# Patient Record
Sex: MALE | Employment: FULL TIME | URBAN - METROPOLITAN AREA
[De-identification: names, ages, dates, MRNs, and addresses within clinical notes are randomized per-mention and may not be internally consistent; named-entity substitution may affect disease eponyms.]

---

## 2023-06-14 ENCOUNTER — OFFICE VISIT (OUTPATIENT)
Dept: OBGYN CLINIC | Facility: OTHER | Age: 43
End: 2023-06-14
Payer: COMMERCIAL

## 2023-06-14 VITALS
BODY MASS INDEX: 34.13 KG/M2 | WEIGHT: 252 LBS | SYSTOLIC BLOOD PRESSURE: 144 MMHG | DIASTOLIC BLOOD PRESSURE: 90 MMHG | HEIGHT: 72 IN | HEART RATE: 73 BPM

## 2023-06-14 DIAGNOSIS — S83.242A OTHER TEAR OF MEDIAL MENISCUS, CURRENT INJURY, LEFT KNEE, INITIAL ENCOUNTER: Primary | ICD-10-CM

## 2023-06-14 PROCEDURE — 99204 OFFICE O/P NEW MOD 45 MIN: CPT | Performed by: ORTHOPAEDIC SURGERY

## 2023-06-14 RX ORDER — CHLORHEXIDINE GLUCONATE 0.12 MG/ML
15 RINSE ORAL ONCE
Status: CANCELLED | OUTPATIENT
Start: 2023-06-20 | End: 2023-06-14

## 2023-06-14 RX ORDER — CEFAZOLIN SODIUM 2 G/50ML
2000 SOLUTION INTRAVENOUS ONCE
Status: CANCELLED | OUTPATIENT
Start: 2023-06-20 | End: 2023-06-14

## 2023-06-14 RX ORDER — IBUPROFEN 600 MG/1
600 TABLET ORAL EVERY 6 HOURS PRN
COMMUNITY
Start: 2023-05-22 | End: 2024-05-21

## 2023-06-14 NOTE — PROGRESS NOTES
Orthopaedic Surgery - Office Note  Roxie Hernandez (55 y o  male)   : 1980   MRN: [id number]  Encounter Date: 2023    Chief Complaint   Patient presents with   • Left Knee - Pain       Assessment / Plan  Left knee displaced bucket-handle medial meniscus tear and chronic partial ACL tear    · The diagnosis and treatment options were reviewed  · The patient wishes to proceed with left knee arthroscopy with medial mensicus repair vs menisectomy  Scheduled for 2023   · The risks, benefits, and alternatives were discussed  · Written consent was obtained  · Patient would like to move forward with mensicus surgery but will defer ACL reconstruction  If he experiences instability after recovering from the meniscus we can re-consider ACL reconstruction     · Reviewed recent MRI during today's visit  · Ice, heat and anti-inflammatories prn   · Soft knee brace prn   Return for 3-4 days post op with Ankit Dhillon   History of Present Illness  Roxie Hernandez is a 38 y o  male who presents for evaluation left knee ACL tear  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 in his knee  He immediately had pain with WB  He followed up with [facility] who recommended MRI and upon review they recommended ACL reconstruction and mensicus  He is here today for a second opinion  He states that he is having minimal pain but does notes some decreased extension  He states that in high school he had a left knee partial ACL tear which was treated non-operatively  He has been wearing a soft knee brace which offers him mild relief in symptoms  He has had mild swelling since the injury  He is active and would like to continue to be active  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  He is currently out of work  Review of Systems  Pertinent items are noted in HPI  All other systems were reviewed and are negative      Physical Exam  /90 (BP Location: Left arm, Patient Position: Sitting, Cuff Size: Adult)   Pulse 73   Ht 6' (1 829 m) Comment: verbal  Wt 114 kg (252 lb)   BMI 34 18 kg/m²   Cons: Appears well  No apparent distress  Psych: Alert  Oriented x3  Mood and affect normal   Eyes: PERRLA, EOMI  Resp: Normal effort  No audible wheezing or stridor  CV: Palpable pulse  No discernable arrhythmia  No LE edema  Lymph:  No palpable cervical, axillary, or inguinal lymphadenopathy  Skin: Warm  No palpable masses  No visible lesions  Neuro: Normal muscle tone  Normal and symmetric DTR's  Left Knee Exam  Alignment:  Normal knee alignment  Inspection:  mild knee swelling  No ecchymosis  Palpation:  mild medial joint line tenderness  No effusion  ROM:  Knee Extension 10  Knee Flexion 115/120  Strength:  Able to SLR without lag  Stability:  Guarding with Lachman (-) Varus instability  (-) Valgus instability  (-) Posterior drawer  Tests:  (+) Jarad  Patella:  Normal patellar mobility  Neurovascular:  Sensation intact in DP/SP/Hayes/Sa/T nerve distributions  2+ DP & PT pulses  Gait:  Antalgic  Studies Reviewed  I have personally reviewed pertinent films in PACS  MRI of left knee - outside images from 06/03/2023 showing partial ACL tear and flipped fragment of medial meniscus     Procedures  No procedures today  Medical, Surgical, Family, and Social History  The patient's medical history, family history, and social history, were reviewed and updated as appropriate  History reviewed  No pertinent past medical history  History reviewed  No pertinent surgical history  History reviewed  No pertinent family history  Social History     Occupational History   • Not on file   Tobacco Use   • Smoking status: Never     Passive exposure: Never   • Smokeless tobacco: Never   Substance and Sexual Activity   • Alcohol use:  Yes   • Drug use: Not on file   • Sexual activity: Not on file       No Known Allergies      Current Outpatient Medications:   •  ibuprofen (MOTRIN) 600 mg tablet, Take 600 mg by mouth every 6 (six) hours as needed, Disp: , Rfl:       [employer]    I,:  Valentino Labrum am acting as a scribe while in the presence of the attending physician :       I,:  Tony Carver MD personally performed the services described in this documentation    as scribed in my presence :

## 2023-06-15 NOTE — PRE-PROCEDURE INSTRUCTIONS
Pre-Surgery Instructions:   Medication Instructions   • ibuprofen (MOTRIN) 600 mg tablet Stop taking 3 days prior to surgery  Medication instructions for day surgery reviewed  Please use only a sip of water to take your instructed medications  Avoid all over the counter vitamins, supplements and NSAIDS for one week prior to surgery per anesthesia guidelines  Tylenol is ok to take as needed  You will receive a call one business day prior to surgery with an arrival time and hospital directions  If your surgery is scheduled on a Monday, the hospital will be calling you on the Friday prior to your surgery  If you have not heard from anyone by 8pm, please call the hospital supervisor through the hospital  at [phone]  Jacqueline Fernandezlewis [phone])  Do not eat or drink anything after midnight the night before your surgery, including candy, mints, lifesavers, or chewing gum  Do not drink alcohol 24hrs before your surgery  Try not to smoke at least 24hrs before your surgery  Follow the pre surgery showering instructions as listed in the [facility] Surgical Experience Booklet” or otherwise provided by your surgeon's office  Do not shave the surgical area 24 hours before surgery  Do not apply any lotions, creams, including makeup, cologne, deodorant, or perfumes after showering on the day of your surgery  No contact lenses, eye make-up, or artificial eyelashes  Remove nail polish, including gel polish, and any artificial, gel, or acrylic nails if possible  Remove all jewelry including rings and body piercing jewelry  Wear causal clothing that is easy to take on and off  Consider your type of surgery  Keep any valuables, jewelry, piercings at home  Please bring any specially ordered equipment (sling, braces) if indicated  Arrange for a responsible person to drive you to and from the hospital on the day of your surgery  Visitor Guidelines discussed       Call the surgeon's office with any new illnesses, exposures, or additional questions prior to surgery  Please reference your [facility] Surgical Experience Booklet” for additional information to prepare for your upcoming surgery

## 2023-06-18 ENCOUNTER — ANESTHESIA EVENT (OUTPATIENT)
Dept: PERIOP | Facility: HOSPITAL | Age: 43
End: 2023-06-18
Payer: COMMERCIAL

## 2023-06-20 ENCOUNTER — ANESTHESIA (OUTPATIENT)
Dept: PERIOP | Facility: HOSPITAL | Age: 43
End: 2023-06-20
Payer: COMMERCIAL

## 2023-06-20 ENCOUNTER — HOSPITAL ENCOUNTER (OUTPATIENT)
Facility: HOSPITAL | Age: 43
Setting detail: OUTPATIENT SURGERY
Discharge: HOME | End: 2023-06-20
Attending: ORTHOPAEDIC SURGERY | Admitting: ORTHOPAEDIC SURGERY
Payer: COMMERCIAL

## 2023-06-20 VITALS
RESPIRATION RATE: 16 BRPM | WEIGHT: 242.06 LBS | TEMPERATURE: 98.2 F | BODY MASS INDEX: 32.79 KG/M2 | OXYGEN SATURATION: 97 % | DIASTOLIC BLOOD PRESSURE: 77 MMHG | HEIGHT: 72 IN | HEART RATE: 78 BPM | SYSTOLIC BLOOD PRESSURE: 164 MMHG

## 2023-06-20 DIAGNOSIS — S83.242A OTHER TEAR OF MEDIAL MENISCUS OF LEFT KNEE AS CURRENT INJURY, INITIAL ENCOUNTER: Primary | ICD-10-CM

## 2023-06-20 PROCEDURE — 29882 ARTHRS KNE SRG MNISC RPR M/L: CPT | Performed by: ORTHOPAEDIC SURGERY

## 2023-06-20 PROCEDURE — C1713 ANCHOR/SCREW BN/BN,TIS/BN: HCPCS | Performed by: ORTHOPAEDIC SURGERY

## 2023-06-20 DEVICE — FAST-FIX 360 CURVED MENISCAL                                    REPAIR SYSTEM
Type: IMPLANTABLE DEVICE | Site: KNEE | Status: FUNCTIONAL
Brand: FAST-FIX

## 2023-06-20 RX ORDER — ONDANSETRON 4 MG/1
4 TABLET, ORALLY DISINTEGRATING ORAL EVERY 8 HOURS PRN
Qty: 15 TABLET | Refills: 0 | Status: SHIPPED | OUTPATIENT
Start: 2023-06-20

## 2023-06-20 RX ORDER — FENTANYL CITRATE/PF 50 MCG/ML
25 SYRINGE (ML) INJECTION
Status: DISCONTINUED | OUTPATIENT
Start: 2023-06-20 | End: 2023-06-20 | Stop reason: HOSPADM

## 2023-06-20 RX ORDER — CEFAZOLIN SODIUM 2 G/50ML
2000 SOLUTION INTRAVENOUS ONCE
Status: COMPLETED | OUTPATIENT
Start: 2023-06-20 | End: 2023-06-20

## 2023-06-20 RX ORDER — KETOROLAC TROMETHAMINE 30 MG/ML
INJECTION, SOLUTION INTRAMUSCULAR; INTRAVENOUS AS NEEDED
Status: DISCONTINUED | OUTPATIENT
Start: 2023-06-20 | End: 2023-06-20

## 2023-06-20 RX ORDER — PROPOFOL 10 MG/ML
INJECTION, EMULSION INTRAVENOUS AS NEEDED
Status: DISCONTINUED | OUTPATIENT
Start: 2023-06-20 | End: 2023-06-20

## 2023-06-20 RX ORDER — OXYCODONE HYDROCHLORIDE 5 MG/1
5 TABLET ORAL EVERY 4 HOURS PRN
Qty: 40 TABLET | Refills: 0 | Status: SHIPPED | OUTPATIENT
Start: 2023-06-20 | End: 2023-06-30

## 2023-06-20 RX ORDER — SODIUM CHLORIDE 9 MG/ML
125 INJECTION, SOLUTION INTRAVENOUS CONTINUOUS
Status: DISCONTINUED | OUTPATIENT
Start: 2023-06-20 | End: 2023-06-20 | Stop reason: HOSPADM

## 2023-06-20 RX ORDER — LIDOCAINE HCL/PF 100 MG/5ML
SYRINGE (ML) INJECTION AS NEEDED
Status: DISCONTINUED | OUTPATIENT
Start: 2023-06-20 | End: 2023-06-20

## 2023-06-20 RX ORDER — CHLORHEXIDINE GLUCONATE 0.12 MG/ML
15 RINSE ORAL ONCE
Status: COMPLETED | OUTPATIENT
Start: 2023-06-20 | End: 2023-06-20

## 2023-06-20 RX ORDER — DEXAMETHASONE SODIUM PHOSPHATE 4 MG/ML
INJECTION, SOLUTION INTRA-ARTICULAR; INTRALESIONAL; INTRAMUSCULAR; INTRAVENOUS; SOFT TISSUE AS NEEDED
Status: DISCONTINUED | OUTPATIENT
Start: 2023-06-20 | End: 2023-06-20

## 2023-06-20 RX ORDER — ONDANSETRON 2 MG/ML
4 INJECTION INTRAMUSCULAR; INTRAVENOUS ONCE AS NEEDED
Status: DISCONTINUED | OUTPATIENT
Start: 2023-06-20 | End: 2023-06-20 | Stop reason: HOSPADM

## 2023-06-20 RX ORDER — ONDANSETRON 2 MG/ML
INJECTION INTRAMUSCULAR; INTRAVENOUS AS NEEDED
Status: DISCONTINUED | OUTPATIENT
Start: 2023-06-20 | End: 2023-06-20

## 2023-06-20 RX ORDER — ROPIVACAINE HYDROCHLORIDE 5 MG/ML
INJECTION, SOLUTION EPIDURAL; INFILTRATION; PERINEURAL
Status: COMPLETED | OUTPATIENT
Start: 2023-06-20 | End: 2023-06-20

## 2023-06-20 RX ORDER — OXYCODONE HYDROCHLORIDE 5 MG/1
5 TABLET ORAL EVERY 4 HOURS PRN
Status: DISCONTINUED | OUTPATIENT
Start: 2023-06-20 | End: 2023-06-20 | Stop reason: HOSPADM

## 2023-06-20 RX ORDER — FENTANYL CITRATE 50 UG/ML
INJECTION, SOLUTION INTRAMUSCULAR; INTRAVENOUS AS NEEDED
Status: DISCONTINUED | OUTPATIENT
Start: 2023-06-20 | End: 2023-06-20

## 2023-06-20 RX ORDER — OXYCODONE HYDROCHLORIDE 5 MG/1
10 TABLET ORAL EVERY 4 HOURS PRN
Status: DISCONTINUED | OUTPATIENT
Start: 2023-06-20 | End: 2023-06-20 | Stop reason: HOSPADM

## 2023-06-20 RX ORDER — ASPIRIN 81 MG/1
81 TABLET ORAL 2 TIMES DAILY
Qty: 28 TABLET | Refills: 0 | Status: SHIPPED | OUTPATIENT
Start: 2023-06-20 | End: 2023-07-04

## 2023-06-20 RX ORDER — MAGNESIUM HYDROXIDE 1200 MG/15ML
LIQUID ORAL AS NEEDED
Status: DISCONTINUED | OUTPATIENT
Start: 2023-06-20 | End: 2023-06-20 | Stop reason: HOSPADM

## 2023-06-20 RX ORDER — LIDOCAINE HYDROCHLORIDE 20 MG/ML
INJECTION, SOLUTION EPIDURAL; INFILTRATION; INTRACAUDAL; PERINEURAL
Status: COMPLETED | OUTPATIENT
Start: 2023-06-20 | End: 2023-06-20

## 2023-06-20 RX ADMIN — PROPOFOL 200 MG: 10 INJECTION, EMULSION INTRAVENOUS at 12:26

## 2023-06-20 RX ADMIN — LIDOCAINE HYDROCHLORIDE 80 MG: 20 INJECTION INTRAVENOUS at 12:26

## 2023-06-20 RX ADMIN — FENTANYL CITRATE 50 MCG: 50 INJECTION INTRAMUSCULAR; INTRAVENOUS at 13:15

## 2023-06-20 RX ADMIN — ROPIVACAINE HYDROCHLORIDE 30 ML: 5 INJECTION EPIDURAL; INFILTRATION; PERINEURAL at 12:02

## 2023-06-20 RX ADMIN — ONDANSETRON 4 MG: 2 INJECTION INTRAMUSCULAR; INTRAVENOUS at 12:26

## 2023-06-20 RX ADMIN — OXYCODONE HYDROCHLORIDE 5 MG: 5 TABLET ORAL at 14:52

## 2023-06-20 RX ADMIN — CEFAZOLIN SODIUM 2000 MG: 2 SOLUTION INTRAVENOUS at 12:21

## 2023-06-20 RX ADMIN — LIDOCAINE HYDROCHLORIDE 20 ML: 20 INJECTION, SOLUTION EPIDURAL; INFILTRATION; INTRACAUDAL at 12:02

## 2023-06-20 RX ADMIN — FENTANYL CITRATE 25 MCG: 50 INJECTION INTRAMUSCULAR; INTRAVENOUS at 13:57

## 2023-06-20 RX ADMIN — SODIUM CHLORIDE 125 ML/HR: 0.9 INJECTION, SOLUTION INTRAVENOUS at 10:05

## 2023-06-20 RX ADMIN — CHLORHEXIDINE GLUCONATE 15 ML: 1.2 RINSE ORAL at 09:51

## 2023-06-20 RX ADMIN — KETOROLAC TROMETHAMINE 30 MG: 30 INJECTION, SOLUTION INTRAMUSCULAR at 13:23

## 2023-06-20 RX ADMIN — DEXAMETHASONE SODIUM PHOSPHATE 10 MG: 4 INJECTION INTRA-ARTICULAR; INTRALESIONAL; INTRAMUSCULAR; INTRAVENOUS; SOFT TISSUE at 12:26

## 2023-06-20 RX ADMIN — FENTANYL CITRATE 50 MCG: 50 INJECTION INTRAMUSCULAR; INTRAVENOUS at 13:34

## 2023-06-20 RX ADMIN — FENTANYL CITRATE 25 MCG: 50 INJECTION INTRAMUSCULAR; INTRAVENOUS at 14:14

## 2023-06-20 RX ADMIN — SODIUM CHLORIDE: 0.9 INJECTION, SOLUTION INTRAVENOUS at 13:13

## 2023-06-20 NOTE — ANESTHESIA PREPROCEDURE EVALUATION
Procedure:  ARTHROSCOPY KNEE, partial meniscectomy vs repair (Left: Knee)    Relevant Problems   Musculoskeletal and Integument   (+) Medial meniscus tear        Physical Exam    Airway    Mallampati score: I  TM Distance: >3 FB  Neck ROM: full     Dental       Cardiovascular  Rhythm: regular, Rate: normal, Cardiovascular exam normal    Pulmonary  Pulmonary exam normal     Other Findings        Anesthesia Plan  ASA Score- 2     Anesthesia Type- general with ASA Monitors  Additional Monitors:   Airway Plan: LMA  Comment: Consent fo nerve block obtained   Plan Factors-Exercise tolerance (METS): >4 METS  Chart reviewed  Existing labs reviewed  Patient summary reviewed  Patient is not a current smoker  Obstructive sleep apnea risk education given perioperatively  Induction- intravenous  Postoperative Plan-     Informed Consent- Anesthetic plan and risks discussed with patient

## 2023-06-20 NOTE — DISCHARGE INSTR - AVS FIRST PAGE
POSTOPERATIVE INSTRUCTIONS following KNEE SURGERY    MEDICATIONS:  Resume all home medications unless otherwise instructed by your surgeon  Pain Medication:  Oxycodone 5 mg, 1-3 tablets every 3 hours as needed  If you were given a regional anesthetic (nerve block), please begin taking the pain medication as soon as you get home, even if you have minimal or no pain  DO NOT WAIT FOR THE NERVE BLOCK TO WEAR OFF  Possible side effects include nausea, constipation, and urinary retention  If you experience these side effects, please call our office for assistance  Pain med refills are authorized only during office hours (8am-4pm, Mon-Fri)  Anti-Inflammatory:  Resume your home anti-inflammatory medication  Take with food  Stop if you experience nausea, reflux, or stomach pain  Nausea Medication:  Zofran ODT 4 mg, 1 tablet every 6 hours as needed  Fill prescription ONLY if you expericnce severe nausea  Blood Clot Prevention:  Aspirin 81 mg, 1 tablet twice daily for 2 weeks  Pump your foot up and down 20 times per hour while you are less mobile  WOUND CARE:  Keep the dressing clean and dry  Light drainage may occur the first 2 days postop  You may remove the dressings and get the incision wet in the shower 72 hours after surgery  DO NOT remove steri-strips or sutures  DO NOT immerse the incision under water  Carefully pat the incision dry  If there is wound drainage, re-apply a fresh dry gauze dressing  Please call our office ([phone]) if you experience either of the following:  Sudden increase in swelling, redness, or warmth at the surgical site  Excessive incisional drainage that persists beyond the 3rd day after surgery  Oral temperature greater than 101 degrees, not relieved with Tylenol  Shortness of breath, chest pain, nausea, or any other concerning symptoms    SWELLING CONTROL:  Cold Therapy:   The cold therapy device may be used either continuously or only as needed, according to your "preference  Do not let the pad directly touch your skin  Alternatively, apply ice (20 min on, 20 min off) as often as you feel is necessary  Elevation:  Elevate the entire leg above heart level  Place pillows under your ankle to keep your knee straight  Compression:  Apply ACE wraps or a thigh-length compression stocking as needed  RANGE OF MOTION:  You are allowed LIMITED RANGE OF MOTION from 0 degrees (full extension) to 90 degrees of flexion    IMMOBILIZATION:  Your elbow brace should be WORN AT ALL TIMES, including sleep  The brace may be unlocked from 0 to 90 degrees  You may remove the brace only for showering  ACTIVITY:   BEAR FULL WEIGHT AS TOLERATED on the operative leg  Use crutches to assist only as needed  Using Crutches on Stairs:  Going up, lead with your \"good\" (nonoperative) leg  Going down, lead with your \"bad\" (operative) leg  Use a hand rail when available  Knee Extension:  Place a rolled towel or pillow under your ankle for 20-30 minutes 3-5 times per day  This will help to maintain full knee extension  Quad Sets:  Sit or lie with your knee straight  Tighten your quadriceps (front thigh) muscle  Hold for 3 seconds, then relax  Repeat 20 times per hour while awake  PHYSICAL THERAPY:  Begin therapy 5 TO 7 DAYS AFTER SURGERY  You were given a prescription for therapy at your preoperative office visit  If you do not have physical therapy scheduled yet, please call our office for assistance  FOLLOW-UP APPOINTMENT:  4-5 days after surgery with:    Dr Cecilia Jose, [address] [facility]  [address] [place] [address], [address] [place] [address], [address] [facility]  [phone] ([facility] [address])  [phone] (After-Hours)    "

## 2023-06-20 NOTE — ANESTHESIA PROCEDURE NOTES
Peripheral Block    Start time: 6/20/2023 11:56 AM  Reason for block: at surgeon's request and post-op pain management  Staffing  Performed by: Fabby Nelson DO  Authorized by: Fabby Nelson DO    Preanesthetic Checklist  Completed: patient identified, IV checked, site marked, risks and benefits discussed, surgical consent, monitors and equipment checked, pre-op evaluation and timeout performed  Peripheral Block  Patient position: sitting  Prep: Betadine  Patient monitoring: heart rate and frequent blood pressure checks  Block type:  Intra-articular  Laterality: left  Injection technique: single-shot  Ultrasound permanent image savedropivacaine (NAROPIN) 0 5 % - Perineural   30 mL - 6/20/2023 12:02:00 PM  lidocaine (XYLOCAINE) 2 % - Infiltration   20 mL - 6/20/2023 12:02:00 PM  Needle  Needle type: long-bevel   Needle gauge: 18 G  Needle length: 10 cm  Needle localization: ultrasound guidance  Test dose: negative  Assessment  Injection assessment: incremental injection  Paresthesia pain: none  Heart rate change: no  Slow fractionated injection: yes  Post-procedure:  site cleaned  patient tolerated the procedure well with no immediate complications

## 2023-06-20 NOTE — ANESTHESIA POSTPROCEDURE EVALUATION
Post-Op Assessment Note    CV Status:  Stable    Pain management: adequate     Mental Status:  Awake   Hydration Status:  Stable   PONV Controlled:  Controlled   Airway Patency:  Patent      Post Op Vitals Reviewed: Yes      Staff: Anesthesiologist         No notable events documented      /93 (06/20/23 1342)    Temp 98 6 °F (37 °C) (06/20/23 1342)    Pulse 78 (06/20/23 1342)   Resp 12 (06/20/23 1342)    SpO2 96 % (06/20/23 1342)

## 2023-06-20 NOTE — OP NOTE
OPERATIVE REPORT    PATIENT NAME: Elvi Bai   :  1980  MRN: [id number]  Pt Location: [facility] OR ROOM 01    SURGERY DATE: 2023    SURGEON(S) and ROLE:  Primary: Rocío Menendez MD    NOTE:  I was present for the entire procedure and performed all essential portions of the surgery  PREOPERATIVE DIAGNOSES:  Left Knee  Medial Meniscus Tear    POSTOPERATIVE DIAGNOSES:  Left Knee  Medial Meniscus Tear    PROCEDURES:  Left Knee Arthroscopy with:  Medial Meniscus Repair      ANESTHESIA TYPE:  General LMA and Intra-articular block    ANESTHESIA STAFF:   Anesthesiologist: Dirk Lara MD  CRNA: Zbigniew Ordaz CRNA    ESTIMATED BLOOD LOSS:  5 mL    TOURNIQUET TIME:  Not used    PERIOPERATIVE ANTIBIOTICS:  cefazolin, 2 grams    IMPLANTS:  Jillian Satchel and [employer] FastFix 360 (x4)    Implant Name Type Inv  Item Serial No   Lot No  LRB No  Used Action   DEVICE FIX FAST- D CURVED MENISCAL REPAIR SYSTEM - [id number]  DEVICE FIX FAST- D CURVED MENISCAL REPAIR SYSTEM  [facility] AND [employer] [id number] Left 1 Implanted   DEVICE FIX FAST- D CURVED MENISCAL REPAIR SYSTEM - [id number]  DEVICE FIX FAST- D CURVED MENISCAL REPAIR SYSTEM  [facility] AND NEPHEW [employer] [id number] Left 1 Implanted   DEVICE FIX FAST- D CURVED MENISCAL REPAIR SYSTEM - [id number]  DEVICE FIX FAST- D CURVED MENISCAL REPAIR SYSTEM  [facility] AND NEPHEW [employer] [id number] Left 1 Implanted   DEVICE FIX FAST- D CURVED MENISCAL REPAIR SYSTEM - [id number]  DEVICE FIX FAST- D CURVED MENISCAL REPAIR SYSTEM  [employer] AND NEPHEW [employer]ild[employer] [id number] Left 1 Implanted       SPECIMENS:  * No specimens in log *    DRAINS:  None      OPERATIVE INDICATIONS:  The patient is a 38 y o  male with left knee pain and a displaced bucket-handle medial meniscus tear    Surgical treatment was indicated due to persistent symptoms despite non-surgical treatment, instability and liklihood of prolonged or permanent functional impairment with non-surgical treatment  After a thorough discussion of the potential risks, benefits, and alternative treatments, the patient agreed to proceed with surgery  The patient understands that the risks of surgery include, but are not limited to: failure of repair, infection, neurovascular injury, wound healing complications, venous thromboembolism, persistent pain, stiffness, instability, recurrence of symptoms, potential need for additional surgeries, and loss of limb or life  Oral and written consent for surgery was obtained from the patient preoperatively  EXAM UNDER ANESTHESIA:  Neutral alignment  ROM:  0-135 degrees  Ligaments stable to varus stress / valgus stress / Lachman (1B) / posterior drawer; negative pivot-shift  Patella tracking normal  without crepitus  PROCEDURE AND TECHNIQUE:  On the day of surgery, the patient was identified in the preoperative holding area  The operative site was marked by the surgeon  The patient was taken into the operating room  A time-out was conducted to confirm the patient's identity, the operative site, and the proposed procedure  The patient was anesthetized, and perioperative antibiotics were administered  The patient was positioned supine on the OR table  All bony prominences were padded  A tourniquet was not used  The operative site was prepped and draped using standard sterile technique  An anterolateral portal was established, and the arthroscope was inserted into the knee joint  An anteromedial portal was established under direct visualization, and diagnostic arthroscopy was performed  The joint demonstrated moderate synovitis which was debrided with a motorized shaver  In the patellofemoral compartment, the trochlea demonstrated diffuse grade 2 chondral wear which required no treatment   The patella demonstrated diffuse grade 2 chondral wear which required no treatment    The patella tracking was normal        In the medial compartment, the medial femoral condyle demonstrated pristine articular cartilage  The medial tibial plateau demonstrated pristine articular cartilage  The medial meniscus had a displaced bucket-handle tear involving the posterior horn and body  The tear involved the vascularized red-zone and was amenable to repair  The meniscus was prepared with a rasp and shaver, and repaired with four FastFix 360 all-inside suture(s)  In the lateral compartment, the lateral femoral condyle demonstrated pristine articular cartilage  The lateral tibial plateau demonstrated pristine articular cartilage  The lateral meniscus was intact       In the intercondylar notch, the PCL was intact  The ACL was partially torn, approximately 50%  At the conclusion of the procedure, the instruments were withdrawn  The portals and incisions were closed with absorbable sutures and steri-strips  A sterile dressing was applied  The surgical drapes were removed  A locked knee brace was applied to the operative knee  The patient was awakened from anesthesia and transported to the PACU in stable condition        COMPLICATIONS:  None    PATIENT DISPOSITION:  PACU       SIGNATURE:  Alex Mariee MD  DATE:  June 20, 2023  TIME:  5:12 PM

## 2023-06-20 NOTE — INTERVAL H&P NOTE
H&P reviewed  After examining the patient I find no changes in the patients condition since the H&P had been written      Vitals:    06/20/23 1134   BP: 138/95   Pulse: 73   Resp: 18   Temp:    SpO2: 96%

## 2023-06-26 ENCOUNTER — OFFICE VISIT (OUTPATIENT)
Dept: OBGYN CLINIC | Facility: MEDICAL CENTER | Age: 43
End: 2023-06-26

## 2023-06-26 VITALS
HEART RATE: 67 BPM | HEIGHT: 72 IN | BODY MASS INDEX: 33.05 KG/M2 | WEIGHT: 244 LBS | SYSTOLIC BLOOD PRESSURE: 119 MMHG | DIASTOLIC BLOOD PRESSURE: 86 MMHG

## 2023-06-26 DIAGNOSIS — S83.242A OTHER TEAR OF MEDIAL MENISCUS, CURRENT INJURY, LEFT KNEE, INITIAL ENCOUNTER: Primary | ICD-10-CM

## 2023-06-26 PROCEDURE — 99024 POSTOP FOLLOW-UP VISIT: CPT | Performed by: PHYSICIAN ASSISTANT

## 2023-06-26 NOTE — PROGRESS NOTES
Orthopaedic Surgery - Office Note  Sita Ferguson (38 y o  male)   : 1980   MRN: [id number]  Encounter Date: 2023    Chief Complaint   Patient presents with   • Left Knee - Post-op       Assessment / Plan  Status post left knee arthroscopy with medial meniscus repair on 2023    · Continue with ice and anti-inflammatories/analgesics as needed for pain  · Start PT following meniscus repair protocol which was provided today to the patient  · Continue use the brace and crutches as instructed  · I did review the patient's interoperative photos with him during today's visit  Return in about 6 weeks (around 2023) for follow up with Dr Christie Rodríguez  History of Present Illness  Shahriar Ferguson is a 38 y o  male who presents for follow-up status post left knee arthroscopy with medial meniscus repair on 2023  Patient is doing well at this time overall feels that his pain is well controlled  He does feel that he is getting around well with the brace and crutches  He denies any issues with the incisions and denies any distal numbness or tingling  Review of Systems  Pertinent items are noted in HPI  All other systems were reviewed and are negative  Physical Exam  /86   Pulse 67   Ht 6' (1 829 m)   Wt 111 kg (244 lb)   BMI 33 09 kg/m²   Cons: Appears well  No apparent distress  Psych: Alert  Oriented x3  Mood and affect normal   Eyes: PERRLA, EOMI  Resp: Normal effort  No audible wheezing or stridor  CV: Palpable pulse  No discernable arrhythmia  No LE edema  Lymph:  No palpable cervical, axillary, or inguinal lymphadenopathy  Skin: Warm  No palpable masses  No visible lesions  Neuro: Normal muscle tone  Normal and symmetric DTR's  Left Knee Exam  Alignment:  Normal knee alignment  Inspection:  Mild swelling as expected, incisions healing well at this time Steri-Strips were replaced  Palpation:  Mild tenderness at Marcela-incisional areas    ROM:  Knee Extension 5 degrees  Knee Flexion 110 degrees  Strength:  Able to actively extend knee against gravity  Stability:  Not tested  Tests:  No pertinent positive or negative tests  Patella:  Not tested  Neurovascular:  Sensation intact in DP/SP/Hayes/Sa/T nerve distributions  Sensation intact in all digital nerve distributions  Toes warm and perfused  Gait:  Steady with crutches and brace locked in full extension    Studies Reviewed  No studies to review    Procedures  No procedures today  Medical, Surgical, Family, and Social History  The patient's medical history, family history, and social history, were reviewed and updated as appropriate  Past Medical History:   Diagnosis Date   • COVID     Oct  2021   • Medial meniscus tear     left   • Uses crutches        Past Surgical History:   Procedure Laterality Date   • COLONOSCOPY     • [place] ARTHRS KNE SURG W/MENISCECTOMY MED/LAT W/SHVG Left 6/20/2023    Procedure: ARTHROSCOPY KNEE medial meniscus repair;  Surgeon: Lysle Dance, MD;  Location: [facility] OR;  Service: Orthopedics   • UMBILICAL HERNIA REPAIR     • VASECTOMY         History reviewed  No pertinent family history      Social History     Occupational History   • Not on file   Tobacco Use   • Smoking status: Never     Passive exposure: Never   • Smokeless tobacco: Never   Vaping Use   • Vaping Use: Not on file   Substance and Sexual Activity   • Alcohol use: Yes     Comment: occ   • Drug use: Not Currently   • Sexual activity: Not on file       No Known Allergies      Current Outpatient Medications:   •  aspirin (ECOTRIN LOW STRENGTH) 81 mg EC tablet, Take 1 tablet (81 mg total) by mouth 2 (two) times a day for 14 days, Disp: 28 tablet, Rfl: 0  •  ibuprofen (MOTRIN) 600 mg tablet, Take 600 mg by mouth every 6 (six) hours as needed, Disp: , Rfl:   •  ondansetron (ZOFRAN-ODT) 4 mg disintegrating tablet, Take 1 tablet (4 mg total) by mouth every 8 (eight) hours as needed for nausea or vomiting (Patient not taking: Reported on 6/26/2023), Disp: 15 tablet, Rfl: 0  •  oxyCODONE (ROXICODONE) 5 immediate release tablet, Take 1 tablet (5 mg total) by mouth every 4 (four) hours as needed for moderate pain for up to 10 days Max Daily Amount: 30 mg (Patient not taking: Reported on 6/26/2023), Disp: 40 tablet, Rfl: 0      Lorie Giron PA-C    Scribe Attestation    I,:   am acting as a scribe while in the presence of the attending physician :       I,:   personally performed the services described in this documentation    as scribed in my presence :

## 2023-08-07 ENCOUNTER — OFFICE VISIT (OUTPATIENT)
Dept: OBGYN CLINIC | Facility: MEDICAL CENTER | Age: 43
End: 2023-08-07

## 2023-08-07 VITALS
WEIGHT: 249 LBS | HEART RATE: 59 BPM | DIASTOLIC BLOOD PRESSURE: 84 MMHG | SYSTOLIC BLOOD PRESSURE: 125 MMHG | HEIGHT: 72 IN | BODY MASS INDEX: 33.72 KG/M2

## 2023-08-07 DIAGNOSIS — S83.242A OTHER TEAR OF MEDIAL MENISCUS, CURRENT INJURY, LEFT KNEE, INITIAL ENCOUNTER: Primary | ICD-10-CM

## 2023-08-07 PROCEDURE — 99024 POSTOP FOLLOW-UP VISIT: CPT | Performed by: ORTHOPAEDIC SURGERY

## 2023-08-07 NOTE — PROGRESS NOTES
Orthopaedic Surgery - Office Note  oFzia Camarena. Jess Stephen (38 y.o. male)   : 1980   MRN: [id number]  Encounter Date: 2023    Chief Complaint   Patient presents with   • Left Knee - Post-op       Assessment / Plan  Status post left knee arthroscopy with medial meniscus repair on 2023- good progression     · Transition out of TROM  · Continue progressing through PT following meniscus repair protocol  · Focus on range motion especially extension  · Recommend wearing TROM locked in extension at night to help obtain  full extension  · Anti-inflammatories and ice as needed for pain  Return in about 6 weeks (around 2023) for w/ Dr. Silviano Bourne. History of Present Illness  Shahriar Stephen is a 38 y.o. male who presents for follow-up status post left knee arthroscopy with medial meniscus repair on 2023. Patient is overall doing well. He is weight bearing in a TROM which he notes has been unlocked for full range of motion for about 3 weeks. He is progressing well through physical therapy. Pain at this time is controlled with OTC anti-inflammatories as needed. He notes continued swelling through the knee. He denies any mechanical symptoms and/or instability. Review of Systems  Pertinent items are noted in HPI. All other systems were reviewed and are negative. Physical Exam  /84   Pulse 59   Ht 6' (1.829 m)   Wt 113 kg (249 lb)   BMI 33.77 kg/m²   Cons: Appears well. No apparent distress. Psych: Alert. Oriented x3. Mood and affect normal.  Eyes: PERRLA, EOMI  Resp: Normal effort. No audible wheezing or stridor. CV: Palpable pulse. No discernable arrhythmia. No LE edema. Lymph:  No palpable cervical, axillary, or inguinal lymphadenopathy. Skin: Warm. No palpable masses. No visible lesions. Neuro: Normal muscle tone. Normal and symmetric DTR's. Left Knee Exam  Alignment:  Normal knee alignment. Inspection:  mild swelling. Incision healed.   Palpation:  mild medial joint line tenderness. ROM:  Knee Extension 5. Knee Flexion 100. Strength:  Able to actively extend knee against gravity. Stability:  No objective knee instability. Stable Varus / Valgus stress, Lachman, and Posterior drawer. Tests:  (-) Jarad. Patella:  Normal patellar mobility. Neurovascular:  Sensation intact in DP/SP/Hayes/Sa/T nerve distributions. 2+ DP & PT pulses. Gait:  Antalgic. Studies Reviewed  No studies to review    Procedures  No procedures today. Medical, Surgical, Family, and Social History  The patient's medical history, family history, and social history, were reviewed and updated as appropriate. Past Medical History:   Diagnosis Date   • COVID     Oct  2021   • Medial meniscus tear     left   • Uses crutches        Past Surgical History:   Procedure Laterality Date   • COLONOSCOPY     • [place] ARTHRS KNE SURG W/MENISCECTOMY MED/LAT W/SHVG Left 6/20/2023    Procedure: ARTHROSCOPY KNEE medial meniscus repair;  Surgeon: Kayla Cruz MD;  Location: [facility];  Service: Orthopedics   • UMBILICAL HERNIA REPAIR     • VASECTOMY         History reviewed. No pertinent family history.     Social History     Occupational History   • Not on file   Tobacco Use   • Smoking status: Never     Passive exposure: Never   • Smokeless tobacco: Never   Vaping Use   • Vaping Use: Not on file   Substance and Sexual Activity   • Alcohol use: Yes     Comment: occ   • Drug use: Not Currently   • Sexual activity: Not on file       No Known Allergies      Current Outpatient Medications:   •  ibuprofen (MOTRIN) 600 mg tablet, Take 600 mg by mouth every 6 (six) hours as needed, Disp: , Rfl:   •  aspirin (ECOTRIN LOW STRENGTH) 81 mg EC tablet, Take 1 tablet (81 mg total) by mouth 2 (two) times a day for 14 days, Disp: 28 tablet, Rfl: 0  •  ondansetron (ZOFRAN-ODT) 4 mg disintegrating tablet, Take 1 tablet (4 mg total) by mouth every 8 (eight) hours as needed for nausea or vomiting (Patient not taking: Reported on 6/26/2023), Disp: 15 tablet, Rfl: 0      Kallie Meli Olguin    Scribe Attestation    I,:  Tequila Massey am acting as a scribe while in the presence of the attending physician.:       I,:  Shelley Blank MD personally performed the services described in this documentation    as scribed in my presence.:

## 2023-08-07 NOTE — LETTER
August 7, 2023     Patient: Lorenzo Bai  YOB: 1980  Date of Visit: 8/7/2023      To Whom it May Concern:    Laura Locke is under my professional care. Medford Holstein was seen in my office on 8/7/2023. Medford Holstein should remain out of work until follow up appointment in 6 weeks, where return to work status will be further discussed. If you have any questions or concerns, please don't hesitate to call.          Sincerely,          Amrita Dowell MD        CC: No Recipients

## 2023-09-18 ENCOUNTER — OFFICE VISIT (OUTPATIENT)
Dept: OBGYN CLINIC | Facility: MEDICAL CENTER | Age: 43
End: 2023-09-18

## 2023-09-18 VITALS
HEART RATE: 67 BPM | WEIGHT: 249 LBS | BODY MASS INDEX: 33.72 KG/M2 | SYSTOLIC BLOOD PRESSURE: 128 MMHG | DIASTOLIC BLOOD PRESSURE: 84 MMHG | HEIGHT: 72 IN

## 2023-09-18 DIAGNOSIS — S83.242A OTHER TEAR OF MEDIAL MENISCUS, CURRENT INJURY, LEFT KNEE, INITIAL ENCOUNTER: Primary | ICD-10-CM

## 2023-09-18 PROCEDURE — 99024 POSTOP FOLLOW-UP VISIT: CPT | Performed by: ORTHOPAEDIC SURGERY

## 2023-09-18 RX ORDER — AZITHROMYCIN 250 MG/1
TABLET, FILM COATED ORAL
COMMUNITY
Start: 2023-09-14

## 2023-09-18 NOTE — PROGRESS NOTES
Orthopaedic Surgery - Office Note  Shiloh Conner. Dixon Figueroa (38 y.o. male)   : 1980   MRN: [id number]  Encounter Date: 2023    Chief Complaint   Patient presents with   • Left Knee - Post-op       Assessment / Plan  Status post left knee arthroscopy with medial meniscus repair on 2023- good progression       · Continue progressing through PT following meniscus repair protocol  · Focus on range motion especially extension  · Anti-inflammatories and ice as needed for pain  · Patient may trial on participating in work without restrictions starting 10/2/23. Work note given to patient stating this. Return in about 6 weeks (around 10/30/2023) for Recheck . History of Present Illness  Shahriarshahzad Figueroa is a 38 y.o. male who presents for follow-up status post left knee arthroscopy with medial meniscus repair on 2023. Patient is overall doing well. He has transitioned out of TROM. He is progressing well through physical therapy working on his limited ROM and strengthening. Pain at this time is controlled with OTC anti-inflammatories as needed. He notes diminished swelling through the knee. He denies any mechanical symptoms and/or instability. He was interested in when he can return to work. Review of Systems  Pertinent items are noted in HPI. All other systems were reviewed and are negative. Physical Exam  /84   Pulse 67   Ht 6' (1.829 m)   Wt 113 kg (249 lb)   BMI 33.77 kg/m²   Cons: Appears well. No apparent distress. Psych: Alert. Oriented x3. Mood and affect normal.  Eyes: PERRLA, EOMI  Resp: Normal effort. No audible wheezing or stridor. CV: Palpable pulse. No discernable arrhythmia. No LE edema. Lymph:  No palpable cervical, axillary, or inguinal lymphadenopathy. Skin: Warm. No palpable masses. No visible lesions. Neuro: Normal muscle tone. Normal and symmetric DTR's. Left Knee Exam  Alignment:  Normal knee alignment. Inspection:  No edema.  minimal quad muscle atrophy. Incision healed. Palpation:  mild medial joint line tenderness. ROM:  Knee Extension 5. Knee Flexion 100. Strength:  Able to actively extend knee against gravity. Stability:  No objective knee instability. Stable Varus / Valgus stress, Lachman, and Posterior drawer. Tests:  (-) Jarad. Patella:  Normal patellar mobility. Neurovascular:  Sensation intact in DP/SP/Hayes/Sa/T nerve distributions. 2+ DP & PT pulses. Gait:  Antalgic. Studies Reviewed  No studies to review    Procedures    No procedures today. Medical, Surgical, Family, and Social History  The patient's medical history, family history, and social history, were reviewed and updated as appropriate. Past Medical History:   Diagnosis Date   • COVID     Oct  2021   • Medial meniscus tear     left   • Uses crutches        Past Surgical History:   Procedure Laterality Date   • COLONOSCOPY     • [place] ARTHRS KNE SURG W/MENISCECTOMY MED/LAT W/SHVG Left 6/20/2023    Procedure: ARTHROSCOPY KNEE medial meniscus repair;  Surgeon: Danika Conway MD;  Location: [facility];  Service: Orthopedics   • UMBILICAL HERNIA REPAIR     • VASECTOMY         History reviewed. No pertinent family history.     Social History     Occupational History   • Not on file   Tobacco Use   • Smoking status: Never     Passive exposure: Never   • Smokeless tobacco: Never   Vaping Use   • Vaping Use: Not on file   Substance and Sexual Activity   • Alcohol use: Yes     Comment: occ   • Drug use: Not Currently   • Sexual activity: Not on file       No Known Allergies      Current Outpatient Medications:   •  ibuprofen (MOTRIN) 600 mg tablet, Take 600 mg by mouth every 6 (six) hours as needed, Disp: , Rfl:   •  aspirin (ECOTRIN LOW STRENGTH) 81 mg EC tablet, Take 1 tablet (81 mg total) by mouth 2 (two) times a day for 14 days, Disp: 28 tablet, Rfl: 0  •  azithromycin (ZITHROMAX) 250 mg tablet, TAKE 2 TABLETS BY MOUTH TODAY, THEN TAKE 1 TABLET DAILY FOR 4 DAYS (Patient not taking: Reported on 9/18/2023), Disp: , Rfl:   •  ondansetron (ZOFRAN-ODT) 4 mg disintegrating tablet, Take 1 tablet (4 mg total) by mouth every 8 (eight) hours as needed for nausea or vomiting (Patient not taking: Reported on 6/26/2023), Disp: 15 tablet, Rfl: 0        Scribe Attestation    I,:  [facility] am acting as a scribe while in the presence of the attending physician.:       I,:  Siobhan Ma MD personally performed the services described in this documentation    as scribed in my presence.:

## 2023-09-18 NOTE — LETTER
September 18, 2023     Patient: Maria C Bai  YOB: 1980  Date of Visit: 9/18/2023      To Whom it May Concern:    Guillermo Park is under my professional care. Rell Laughlin was seen in my office on 9/18/2023. Rell Laughlin may participate in trial of unrestricted work starting on 10/2/23. If you have any questions or concerns, please don't hesitate to call.          Sincerely,          Siobhan Ma MD        CC: No Recipients

## 2023-09-22 ENCOUNTER — TELEPHONE (OUTPATIENT)
Age: 43
End: 2023-09-22

## 2023-09-22 NOTE — TELEPHONE ENCOUNTER
Caller: patient    Doctor: Mary Hendrix    Reason for call: called to check on status of forms and to confirm they were faxed    Call back#: n/a

## 2023-09-25 ENCOUNTER — TELEPHONE (OUTPATIENT)
Age: 43
End: 2023-09-25

## 2023-11-01 ENCOUNTER — OFFICE VISIT (OUTPATIENT)
Dept: OBGYN CLINIC | Facility: OTHER | Age: 43
End: 2023-11-01
Payer: COMMERCIAL

## 2023-11-01 VITALS
BODY MASS INDEX: 33.59 KG/M2 | HEART RATE: 60 BPM | HEIGHT: 72 IN | DIASTOLIC BLOOD PRESSURE: 87 MMHG | WEIGHT: 248 LBS | SYSTOLIC BLOOD PRESSURE: 133 MMHG

## 2023-11-01 DIAGNOSIS — S83.242A OTHER TEAR OF MEDIAL MENISCUS, CURRENT INJURY, LEFT KNEE, INITIAL ENCOUNTER: Primary | ICD-10-CM

## 2023-11-01 PROCEDURE — 99213 OFFICE O/P EST LOW 20 MIN: CPT | Performed by: ORTHOPAEDIC SURGERY

## 2023-11-01 NOTE — PROGRESS NOTES
Orthopaedic Surgery - Office Note  Marquis Bai (38 y.o. male)   : 1980   MRN: [id number]  Encounter Date: 2023    Chief Complaint   Patient presents with    Left Knee - Follow-up       Assessment / Plan  4.5 months status post left knee arthroscopy with medial meniscus repair on 2023- good progression     Doing well, allowed to return to all activity as tolerated  Follow up as needed    History of Present Illness  Shahriar Coy is a 38 y.o. male who presents for 4.5 months status post left knee arthroscopy with medial meniscus repair on 2023. Patient is doing well. We last saw the patient at his 3-month follow-up visit, at which time he is doing well. He has since returned to work and states that he walks about 10 miles per day. He has had no pain, instability, catching or locking of his left knee. He is here for regularly scheduled follow-up for 4.5 months status post medial meniscus repair. 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 Review of Systems  Pertinent items are noted in HPI. All other systems were reviewed and are negative. Physical Exam  /87   Pulse 60   Ht 6' (1.829 m)   Wt 112 kg (248 lb)   BMI 33.63 kg/m²   Cons: Appears well. No apparent distress. Psych: Alert. Oriented x3. Mood and affect normal.  Eyes: PERRLA, EOMI  Resp: Normal effort. No audible wheezing or stridor. CV: Palpable pulse. No discernable arrhythmia. No LE edema. Lymph:  No palpable cervical, axillary, or inguinal lymphadenopathy. Skin: Warm. No palpable masses. No visible lesions. Neuro: Normal muscle tone. Normal and symmetric DTR's. Left Knee Exam  Alignment:  Normal lumbar alignment. Inspection:  No swelling. Palpation:  No tenderness. ROM:   Normal flexion, 10 degrees short of full extension  Strength:  5/5 hip flexors and abductors. Able to actively extend knee against gravity.  Able to actively dorsiflex & plantarflex ankle and toes.  Stability:  No objective knee instability. Stable Varus / Valgus stress, Lachman, and Posterior drawer. Tests:  No pertinent positive or negative tests. Patella:  Patella tracks centrally without crepitus. Neurovascular:  Sensation intact in DP/SP/Hayes/Sa/T nerve distributions. Toes warm and perfused. Gait:  Not tested. Studies Reviewed  No studies to review    Procedures  No procedures today. Medical, Surgical, Family, and Social History  The patient's medical history, family history, and social history, were reviewed and updated as appropriate. Past Medical History:   Diagnosis Date    COVID     Oct  2021    Medial meniscus tear     left    Uses crutches        Past Surgical History:   Procedure Laterality Date    COLONOSCOPY      [place] ARTHRS KNE SURG W/MENISCECTOMY MED/LAT W/SHVG Left 6/20/2023    Procedure: ARTHROSCOPY KNEE medial meniscus repair;  Surgeon: Omer Aguilar MD;  Location: [facility];  Service: Health As We [facility]         History reviewed. No pertinent family history.     Social History     Occupational History    Not on file   Tobacco Use    Smoking status: Never     Passive exposure: Never    Smokeless tobacco: Never   Vaping Use    Vaping Use: Not on file   Substance and Sexual Activity    Alcohol use: Yes     Comment: occ    Drug use: Not Currently    Sexual activity: Not on file       No Known Allergies      Current Outpatient Medications:     ibuprofen (MOTRIN) 600 mg tablet, Take 600 mg by mouth every 6 (six) hours as needed, Disp: , Rfl:     aspirin (ECOTRIN LOW STRENGTH) 81 mg EC tablet, Take 1 tablet (81 mg total) by mouth 2 (two) times a day for 14 days, Disp: 28 tablet, Rfl: 0    azithromycin (ZITHROMAX) 250 mg tablet, TAKE 2 TABLETS BY MOUTH TODAY, THEN TAKE 1 TABLET DAILY FOR 4 DAYS (Patient not taking: Reported on 9/18/2023), Disp: , Rfl:     ondansetron (ZOFRAN-ODT) 4 mg disintegrating tablet, Take 1 tablet (4 mg total) by mouth every 8 (eight) hours as needed for nausea or vomiting (Patient not taking: Reported on 6/26/2023), Disp: 15 tablet, Rfl: 0      Cony Camarena MD    Scribe Attestation      I,:   am acting as a scribe while in the presence of the attending physician.:       I,:   personally performed the services described in this documentation    as scribed in my presence.:

## (undated) DEVICE — PROBE ABLATION  APOLLO RF 90 DEG MULTI PORT

## (undated) DEVICE — ACE WRAP 6 IN UNSTERILE

## (undated) DEVICE — TIBURON SPLIT SHEET: Brand: CONVERTORS

## (undated) DEVICE — INTENDED FOR TISSUE SEPARATION, AND OTHER PROCEDURES THAT REQUIRE A SHARP SURGICAL BLADE TO PUNCTURE OR CUT.: Brand: BARD-PARKER ® CARBON RIB-BACK BLADES

## (undated) DEVICE — 3M™ STERI-STRIP™ REINFORCED ADHESIVE SKIN CLOSURES, R1547, 1/2 IN X 4 IN (12 MM X 100 MM), 6 STRIPS/ENVELOPE: Brand: 3M™ STERI-STRIP™

## (undated) DEVICE — SYRINGE 20ML LL

## (undated) DEVICE — KNOT PUSHER/SUTURE CUTTER W/ PORTAL SKID BUNDLE

## (undated) DEVICE — BRACE-LONG KNEE TROM 66CM

## (undated) DEVICE — GLOVE SRG BIOGEL 7.5

## (undated) DEVICE — COBAN 6 IN STERILE

## (undated) DEVICE — IMPERVIOUS STOCKINETTE: Brand: DEROYAL

## (undated) DEVICE — SCD SEQUENTIAL COMPRESSION COMFORT SLEEVE MEDIUM KNEE LENGTH: Brand: KENDALL SCD

## (undated) DEVICE — PADDING CAST 6IN COTTON STRL

## (undated) DEVICE — TUBING SUCTION 5MM X 12 FT

## (undated) DEVICE — CYSTO TUBING TUR Y IRRIGATION

## (undated) DEVICE — BLADE SHAVER DISSECTOR 4MM 13CM COOLCUT

## (undated) DEVICE — TUBING EXTENSION 6 FT CONTIN WAVE

## (undated) DEVICE — GLOVE SRG BIOGEL 8

## (undated) DEVICE — ASTOUND STANDARD SURGICAL GOWN, XL: Brand: CONVERTORS

## (undated) DEVICE — SUT MONOCRYL 2-0 SH 27 IN Y417H

## (undated) DEVICE — 3M™ STERI-DRAPE™ U-DRAPE 1015: Brand: STERI-DRAPE™

## (undated) DEVICE — NEEDLE 18 G X 1 1/2

## (undated) DEVICE — BETHLEHEM UNIVERSAL  ARTHRO PK: Brand: CARDINAL HEALTH

## (undated) DEVICE — ABDOMINAL PAD: Brand: DERMACEA

## (undated) DEVICE — CHLORAPREP HI-LITE 26ML ORANGE

## (undated) DEVICE — GLOVE SRG BIOGEL 6.5

## (undated) DEVICE — 3M™ IOBAN™ 2 ANTIMICROBIAL INCISE DRAPE 6650EZ: Brand: IOBAN™ 2

## (undated) DEVICE — 4-PORT MANIFOLD: Brand: NEPTUNE 2

## (undated) DEVICE — GLOVE INDICATOR PI UNDERGLOVE SZ 7 BLUE

## (undated) DEVICE — GLOVE INDICATOR PI UNDERGLOVE SZ 8.5 BLUE